# Patient Record
Sex: MALE | Race: WHITE | NOT HISPANIC OR LATINO | Employment: STUDENT | URBAN - METROPOLITAN AREA
[De-identification: names, ages, dates, MRNs, and addresses within clinical notes are randomized per-mention and may not be internally consistent; named-entity substitution may affect disease eponyms.]

---

## 2023-12-19 ENCOUNTER — OFFICE VISIT (OUTPATIENT)
Dept: PODIATRY | Facility: CLINIC | Age: 23
End: 2023-12-19
Payer: COMMERCIAL

## 2023-12-19 ENCOUNTER — LAB (OUTPATIENT)
Dept: LAB | Facility: LAB | Age: 23
End: 2023-12-19
Payer: COMMERCIAL

## 2023-12-19 DIAGNOSIS — B35.1 ONYCHOMYCOSIS: Primary | ICD-10-CM

## 2023-12-19 DIAGNOSIS — B35.1 ONYCHOMYCOSIS: ICD-10-CM

## 2023-12-19 LAB
ALBUMIN SERPL BCP-MCNC: 4.8 G/DL (ref 3.4–5)
ALP SERPL-CCNC: 82 U/L (ref 33–120)
ALT SERPL W P-5'-P-CCNC: 29 U/L (ref 10–52)
ANION GAP SERPL CALC-SCNC: 10 MMOL/L (ref 10–20)
AST SERPL W P-5'-P-CCNC: 23 U/L (ref 9–39)
BILIRUB SERPL-MCNC: 0.8 MG/DL (ref 0–1.2)
BUN SERPL-MCNC: 17 MG/DL (ref 6–23)
CALCIUM SERPL-MCNC: 9.8 MG/DL (ref 8.6–10.3)
CHLORIDE SERPL-SCNC: 101 MMOL/L (ref 98–107)
CO2 SERPL-SCNC: 29 MMOL/L (ref 21–32)
CREAT SERPL-MCNC: 1.01 MG/DL (ref 0.5–1.3)
GFR SERPL CREATININE-BSD FRML MDRD: >90 ML/MIN/1.73M*2
GLUCOSE SERPL-MCNC: 60 MG/DL (ref 74–99)
POTASSIUM SERPL-SCNC: 4.1 MMOL/L (ref 3.5–5.3)
PROT SERPL-MCNC: 7.6 G/DL (ref 6.4–8.2)
SODIUM SERPL-SCNC: 136 MMOL/L (ref 136–145)

## 2023-12-19 PROCEDURE — 80053 COMPREHEN METABOLIC PANEL: CPT

## 2023-12-19 PROCEDURE — 99213 OFFICE O/P EST LOW 20 MIN: CPT | Performed by: PODIATRIST

## 2023-12-19 PROCEDURE — 36415 COLL VENOUS BLD VENIPUNCTURE: CPT

## 2023-12-19 NOTE — PROGRESS NOTES
History Of Present Illness  Doc Ruiz is a 22 y.o. male presenting with chief complaint of:  Fungus of right great toenail has been a recurrent for the patient.  Nail is loose.     Past Medical History  He has no past medical history on file.    Surgical History  He has a past surgical history that includes Other surgical history (08/15/2022).     Social History  He has no history on file for tobacco use, alcohol use, and drug use.    Family History  No family history on file.     Allergies  Amoxicillin and Penicillins    Medications  No current outpatient medications on file.     No current facility-administered medications for this visit.       Review of Systems    REVIEW OF SYSTEMS  GENERAL:  Negative for malaise, significant weight loss, fever  CARDIOVASCULAR: leg swelling   MUSCULOSKELETAL:  Negative for joint pain or swelling, back pain, and muscle pain.  SKIN:  Negative for lesions, rash, and itching  PSYCH:  Negative for sleep disturbance, mood disorder and recent psychosocial stressors  NEURO: Negative, denies any burning, tingling or numbness     Objective:   Vasc: DP and PT pulses are palpable bilateral.  CFT is less than 3 seconds bilateral.  Skin temperature is warm to cool proximal to distal bilateral.      Neuro:  Light touch is intact to the foot bilateral.      Derm: Nails are normal. Skin is supple with normal texture and turgor noted.  Webspaces are clean, dry and intact bilateral.  There are no hyperkeratoses, ulcerations, verruca or other lesions noted.  Right first toenail is loosened on the proximal nail fold.  There is dried blood and fungal changes noted.    Ortho: Muscle strength is 5/5 for all pedal groups tested.  Ankle joint, subtalar joint, 1st MPJ and lesser MPJ ROM is full and without pain or crepitus.  The foot type is rectus bilateral off weight bearing.  There are no structural deformities noted.    Assessment/Plan     Problem List Items Addressed This Visit        Onychomycosis - Primary    Relevant Orders    Comprehensive Metabolic Panel       Will get LFTs today to see if patient is eligible for terbinafine.  Patient can follow-up with me as needed.  Mycotic nails debrided in length and thickness

## 2023-12-20 DIAGNOSIS — B35.1 ONYCHOMYCOSIS: Primary | ICD-10-CM

## 2023-12-20 RX ORDER — TERBINAFINE HYDROCHLORIDE 250 MG/1
250 TABLET ORAL DAILY
Qty: 30 TABLET | Refills: 2 | Status: SHIPPED | OUTPATIENT
Start: 2023-12-20 | End: 2024-03-13

## 2025-06-16 ENCOUNTER — TELEPHONE (OUTPATIENT)
Dept: PRIMARY CARE | Facility: CLINIC | Age: 25
End: 2025-06-16

## 2025-06-16 ENCOUNTER — APPOINTMENT (OUTPATIENT)
Dept: PEDIATRICS | Facility: CLINIC | Age: 25
End: 2025-06-16
Payer: COMMERCIAL

## 2025-06-16 NOTE — TELEPHONE ENCOUNTER
Pt mom would like a call regarding us recommending her son start care with a primary care.  [phone]

## 2025-06-23 ENCOUNTER — APPOINTMENT (OUTPATIENT)
Dept: PEDIATRICS | Facility: CLINIC | Age: 25
End: 2025-06-23
Payer: COMMERCIAL

## 2025-06-23 VITALS
HEIGHT: 69 IN | RESPIRATION RATE: 16 BRPM | HEART RATE: 64 BPM | SYSTOLIC BLOOD PRESSURE: 106 MMHG | DIASTOLIC BLOOD PRESSURE: 72 MMHG | BODY MASS INDEX: 25.43 KG/M2 | WEIGHT: 171.7 LBS

## 2025-06-23 DIAGNOSIS — R53.83 FATIGUE, UNSPECIFIED TYPE: ICD-10-CM

## 2025-06-23 DIAGNOSIS — R25.1 TREMOR: ICD-10-CM

## 2025-06-23 DIAGNOSIS — Z13.220 SCREENING CHOLESTEROL LEVEL: ICD-10-CM

## 2025-06-23 DIAGNOSIS — R00.2 PALPITATIONS: Primary | ICD-10-CM

## 2025-06-23 PROCEDURE — 99214 OFFICE O/P EST MOD 30 MIN: CPT | Performed by: STUDENT IN AN ORGANIZED HEALTH CARE EDUCATION/TRAINING PROGRAM

## 2025-06-23 PROCEDURE — 3008F BODY MASS INDEX DOCD: CPT | Performed by: STUDENT IN AN ORGANIZED HEALTH CARE EDUCATION/TRAINING PROGRAM

## 2025-06-23 ASSESSMENT — ENCOUNTER SYMPTOMS
TREMORS: 1
PALPITATIONS: 1

## 2025-06-23 NOTE — PROGRESS NOTES
"Subjective   Patient ID: Doc Ruiz is a 24 y.o. male who presents for Palpitations.  Today he is alone.     Doc is here today because he has been feeling off. He has had some times where he feels like his heart his beating faster than normal at times for no reason when he is just sitting still. It usually lasts a few minutes. It typically just slowly fades out to normal. It does not feel like it is skipping beats. He does not have associated lightheadedness or dizziness. He does sometimes feel like he breaths heavier when that happens. Doesn't seem to be related to feeling anxious or nervous.    He has also noticed a tremor in his hands. He feels like it has been there for years but mom has noticed it more in the past 6 months.    He also has been feeling more tired than usual. He gets about 8 hours of sleep at night. Doesn't think he snores. He does feel rested after sleep.    He has gained weight in the past couple years. He does not exercise much and admits to a big appetite.     He has noticed some hair thinning as well.    Palpitations         Review of Systems   Cardiovascular:  Positive for palpitations.   Neurological:  Positive for tremors.       Objective   /72   Pulse 64   Resp 16   Ht 1.753 m (5' 9.02\")   Wt 77.9 kg (171 lb 11.2 oz)   BMI 25.34 kg/m²   Growth percentiles: Facility age limit for growth %hilda is 20 years. Facility age limit for growth %hilda is 20 years.     Physical Exam  Constitutional:       Appearance: He is normal weight.   Eyes:      Conjunctiva/sclera: Conjunctivae normal.   Cardiovascular:      Rate and Rhythm: Normal rate and regular rhythm.      Heart sounds: Normal heart sounds.   Pulmonary:      Effort: Pulmonary effort is normal.      Breath sounds: Normal breath sounds.   Musculoskeletal:      Cervical back: Normal range of motion.   Neurological:      Mental Status: He is alert.      Comments: Tremor when holding hands out         No results found " for this or any previous visit (from the past 24 hours).    Assessment/Plan   Problem List Items Addressed This Visit    None  Visit Diagnoses         Palpitations    -  Primary    Relevant Orders    TSH with reflex to Free T4 if abnormal    Referral to Cardiology      Tremor        Relevant Orders    TSH with reflex to Free T4 if abnormal      Fatigue, unspecified type        Relevant Orders    CBC    Comprehensive Metabolic Panel    Vitamin D 25-Hydroxy,Total (for eval of Vitamin D levels)      Screening cholesterol level        Relevant Orders    Lipid Panel

## 2025-06-24 LAB
25(OH)D3+25(OH)D2 SERPL-MCNC: 43 NG/ML (ref 30–100)
ALBUMIN SERPL-MCNC: 4.9 G/DL (ref 3.6–5.1)
ALP SERPL-CCNC: 69 U/L (ref 36–130)
ALT SERPL-CCNC: 54 U/L (ref 9–46)
ANION GAP SERPL CALCULATED.4IONS-SCNC: 10 MMOL/L (CALC) (ref 7–17)
AST SERPL-CCNC: 26 U/L (ref 10–40)
BILIRUB SERPL-MCNC: 0.8 MG/DL (ref 0.2–1.2)
BUN SERPL-MCNC: 14 MG/DL (ref 7–25)
CALCIUM SERPL-MCNC: 9.7 MG/DL (ref 8.6–10.3)
CHLORIDE SERPL-SCNC: 103 MMOL/L (ref 98–110)
CHOLEST SERPL-MCNC: 194 MG/DL
CHOLEST/HDLC SERPL: 4 (CALC)
CO2 SERPL-SCNC: 23 MMOL/L (ref 20–32)
CREAT SERPL-MCNC: 0.86 MG/DL (ref 0.6–1.24)
EGFRCR SERPLBLD CKD-EPI 2021: 126 ML/MIN/1.73M2
ERYTHROCYTE [DISTWIDTH] IN BLOOD BY AUTOMATED COUNT: 11.7 % (ref 11–15)
GLUCOSE SERPL-MCNC: 80 MG/DL (ref 65–99)
HCT VFR BLD AUTO: 50.6 % (ref 38.5–50)
HDLC SERPL-MCNC: 49 MG/DL
HGB BLD-MCNC: 17 G/DL (ref 13.2–17.1)
LDLC SERPL CALC-MCNC: 126 MG/DL (CALC)
MCH RBC QN AUTO: 31 PG (ref 27–33)
MCHC RBC AUTO-ENTMCNC: 33.6 G/DL (ref 32–36)
MCV RBC AUTO: 92.3 FL (ref 80–100)
NONHDLC SERPL-MCNC: 145 MG/DL (CALC)
PLATELET # BLD AUTO: 296 THOUSAND/UL (ref 140–400)
PMV BLD REES-ECKER: 9.1 FL (ref 7.5–12.5)
POTASSIUM SERPL-SCNC: 4.4 MMOL/L (ref 3.5–5.3)
PROT SERPL-MCNC: 7.7 G/DL (ref 6.1–8.1)
RBC # BLD AUTO: 5.48 MILLION/UL (ref 4.2–5.8)
SODIUM SERPL-SCNC: 136 MMOL/L (ref 135–146)
TRIGL SERPL-MCNC: 88 MG/DL
TSH SERPL-ACNC: 1.32 MIU/L (ref 0.4–4.5)
WBC # BLD AUTO: 5.4 THOUSAND/UL (ref 3.8–10.8)

## 2025-06-25 ENCOUNTER — TELEPHONE (OUTPATIENT)
Dept: PEDIATRICS | Facility: CLINIC | Age: 25
End: 2025-06-25
Payer: COMMERCIAL

## 2025-06-25 NOTE — TELEPHONE ENCOUNTER
Result Communication    Resulted Orders   CBC   Result Value Ref Range    WHITE BLOOD CELL COUNT 5.4 3.8 - 10.8 Thousand/uL    RED BLOOD CELL COUNT 5.48 4.20 - 5.80 Million/uL    HEMOGLOBIN 17.0 13.2 - 17.1 g/dL    HEMATOCRIT 50.6 (H) 38.5 - 50.0 %    MCV 92.3 80.0 - 100.0 fL    MCH 31.0 27.0 - 33.0 pg    MCHC 33.6 32.0 - 36.0 g/dL      Comment:      For adults, a slight decrease in the calculated MCHC  value (in the range of 30 to 32 g/dL) is most likely  not clinically significant; however, it should be  interpreted with caution in correlation with other  red cell parameters and the patient's clinical  condition.      RDW 11.7 11.0 - 15.0 %    PLATELET COUNT 296 140 - 400 Thousand/uL    MPV 9.1 7.5 - 12.5 fL    Narrative    FASTING:YES    FASTING: YES   Comprehensive Metabolic Panel   Result Value Ref Range    GLUCOSE 80 65 - 99 mg/dL      Comment:                    Fasting reference interval         UREA NITROGEN (BUN) 14 7 - 25 mg/dL    CREATININE 0.86 0.60 - 1.24 mg/dL    EGFR 126 > OR = 60 mL/min/1.73m2    SODIUM 136 135 - 146 mmol/L    POTASSIUM 4.4 3.5 - 5.3 mmol/L    CHLORIDE 103 98 - 110 mmol/L    CARBON DIOXIDE 23 20 - 32 mmol/L    ELECTROLYTE BALANCE 10 7 - 17 mmol/L (calc)    CALCIUM 9.7 8.6 - 10.3 mg/dL    PROTEIN, TOTAL 7.7 6.1 - 8.1 g/dL    ALBUMIN 4.9 3.6 - 5.1 g/dL    BILIRUBIN, TOTAL 0.8 0.2 - 1.2 mg/dL    ALKALINE PHOSPHATASE 69 36 - 130 U/L    AST 26 10 - 40 U/L    ALT 54 (H) 9 - 46 U/L    Narrative    FASTING:YES    FASTING: YES   Lipid Panel   Result Value Ref Range    CHOLESTEROL, TOTAL 194 <200 mg/dL    HDL CHOLESTEROL 49 > OR = 40 mg/dL    TRIGLYCERIDES 88 <150 mg/dL    LDL-CHOLESTEROL 126 (H) mg/dL (calc)      Comment:      Reference range: <100     Desirable range <100 mg/dL for primary prevention;    <70 mg/dL for patients with CHD or diabetic patients   with > or = 2 CHD risk factors.     LDL-C is now calculated using the Angelo   calculation, which is a validated novel method  providing   better accuracy than the Friedewald equation in the   estimation of LDL-C.   Prateek SS et al. IRWIN. 2013;310(19): 8466-1862   (http://education.[facility]/faq/AXS098)      CHOL/HDLC RATIO 4.0 <5.0 (calc)    NON HDL CHOLESTEROL 145 (H) <130 mg/dL (calc)      Comment:      For patients with diabetes plus 1 major ASCVD risk   factor, treating to a non-HDL-C goal of <100 mg/dL   (LDL-C of <70 mg/dL) is considered a therapeutic   option.      Narrative    FASTING:YES    FASTING: YES   TSH with reflex to Free T4 if abnormal   Result Value Ref Range    TSH W/REFLEX TO FT4 1.32 0.40 - 4.50 mIU/L    Narrative    FASTING:YES    FASTING: YES   Vitamin D 25-Hydroxy,Total (for eval of Vitamin D levels)   Result Value Ref Range    VITAMIN D,25-OH,TOTAL,IA 43 30 - 100 ng/mL      Comment:      Vitamin D Status         25-OH Vitamin D:     Deficiency:                    <20 ng/mL  Insufficiency:             20 - 29 ng/mL  Optimal:                 > or = 30 ng/mL     For 25-OH Vitamin D testing on patients on   D2-supplementation and patients for whom quantitation   of D2 and D3 fractions is required, the QuestAssureD(TM)  25-OH VIT D, (D2,D3), LC/MS/MS is recommended: order   code 57423 (patients >2yrs).     See Note 1     Note 1     For additional information, please refer to   http://education.[facility]/faq/OJO943   (This link is being provided for informational/  educational purposes only.)      Narrative    FASTING:YES    FASTING: YES       8:26 AM      Results were successfully communicated with the patient and they acknowledged their understanding.

## 2025-06-25 NOTE — TELEPHONE ENCOUNTER
----- Message from Rosie Burr sent at 6/25/2025  8:12 AM EDT -----  Lab work showed mildly elevated cholesterol. Focus on healthy low fat diet with more fruits and vegetables  ----- Message -----  From: Sandy Lafleur Results In  Sent: 6/24/2025   8:33 PM EDT  To: Rosie Burr MD

## 2025-06-26 DIAGNOSIS — R53.83 FATIGUE, UNSPECIFIED TYPE: Primary | ICD-10-CM

## 2025-06-30 LAB — FERRITIN SERPL-MCNC: 133 NG/ML (ref 38–380)

## 2025-07-01 ENCOUNTER — APPOINTMENT (OUTPATIENT)
Dept: CARDIOLOGY | Facility: CLINIC | Age: 25
End: 2025-07-01
Payer: COMMERCIAL

## 2025-07-01 VITALS
WEIGHT: 173 LBS | HEIGHT: 69 IN | BODY MASS INDEX: 25.62 KG/M2 | HEART RATE: 89 BPM | SYSTOLIC BLOOD PRESSURE: 110 MMHG | DIASTOLIC BLOOD PRESSURE: 62 MMHG

## 2025-07-01 DIAGNOSIS — R00.2 PALPITATIONS: ICD-10-CM

## 2025-07-01 DIAGNOSIS — R94.31 ABNORMAL ELECTROCARDIOGRAM (ECG) (EKG): Primary | ICD-10-CM

## 2025-07-01 PROCEDURE — 99205 OFFICE O/P NEW HI 60 MIN: CPT | Performed by: NURSE PRACTITIONER

## 2025-07-01 PROCEDURE — 93000 ELECTROCARDIOGRAM COMPLETE: CPT | Performed by: INTERNAL MEDICINE

## 2025-07-01 PROCEDURE — 3008F BODY MASS INDEX DOCD: CPT | Performed by: NURSE PRACTITIONER

## 2025-07-02 NOTE — PROGRESS NOTES
Chief Complaint:  Chief Complaint   Patient presents with    New Patient Visit     Pt states he is having a fast heart rate       Doc Ruiz is a 24 y.o. male that presents to the office today with his mother for new patient cardiac evaluation.  He was referred by his primary care provider Shannan Burr MD for palpitations.  He denies history of CAD or valve disorders.  Denies previous cardiac evaluation.  He has a history of palpitations, tremors and fatigue.  He denies smoking, vaping, alcohol use, recreational drug use.  Denies caffeine use.  Consumes water.  Reports he is active he walks and golfs.  Denies family history of CAD.    He reports he has had ongoing palpitations for approximately 6 months but feels recently they have become more frequent and longer in duration.  He also states that he feels himself his heart rate elevates often and he notices this when he is at rest.  He denies any chest pain, chest pressure, chest tightness, lightheadedness or dizziness.  He does admit to some occasional shortness of breath.  Denies lower extremity edema.  His mother notes bilateral hand tremors which he feels has been ongoing for a very long time but she feels is new.     Testing Reviewed  EKG obtained in the office today and verified with Dr. Trevizo  shows NSR, right atrial enlargement HR 89  bpm, QT/Qtc 332/403    CBC:   Lab Results   Component Value Date    WBC 5.4 06/24/2025    RBC 5.48 06/24/2025    HGB 17.0 06/24/2025    HCT 50.6 (H) 06/24/2025     06/24/2025        CMP:    Lab Results   Component Value Date     06/24/2025    K 4.4 06/24/2025     06/24/2025    CO2 23 06/24/2025    BUN 14 06/24/2025    CREATININE 0.86 06/24/2025    GLUCOSE 80 06/24/2025    CALCIUM 9.7 06/24/2025       Lipid Profile:    Lab Results   Component Value Date    TRIG 88 06/24/2025    HDL 49 06/24/2025    LDLCALC 126 (H) 06/24/2025       BMP:  Lab Results   Component Value Date     06/24/2025     " 12/19/2023    K 4.4 06/24/2025    K 4.1 12/19/2023     06/24/2025     12/19/2023    CO2 23 06/24/2025    CO2 29 12/19/2023    BUN 14 06/24/2025    BUN 17 12/19/2023    CREATININE 0.86 06/24/2025    CREATININE 1.01 12/19/2023       CBC:  Lab Results   Component Value Date    WBC 5.4 06/24/2025    RBC 5.48 06/24/2025    HGB 17.0 06/24/2025    HCT 50.6 (H) 06/24/2025    MCV 92.3 06/24/2025    MCH 31.0 06/24/2025    MCHC 33.6 06/24/2025    RDW 11.7 06/24/2025     06/24/2025    MPV 9.1 06/24/2025       Hepatic Function Panel:    Lab Results   Component Value Date    ALKPHOS 69 06/24/2025    ALT 54 (H) 06/24/2025    AST 26 06/24/2025    PROT 7.7 06/24/2025    BILITOT 0.8 06/24/2025     TSH:    Lab Results   Component Value Date    TSH 1.32 06/24/2025         Problem List[1]    Social History[2]    Medical History[3]    Current Medications[4]    Amoxicillin and Penicillins    Family History[5]    Surgical History[6]       Review of systems  Constitutional: No weight loss, fever, chills, weakness or fatigue  HEENT: No visual loss, blurred vision, double vision or yellow sclerae  Skin: No rash or itching  Cardiovascular: No chest pain, pressure or discomfort.  Positive for palpitations  Respiratory: No shortness of breath, cough or sputum  Gastrointestinal: No nausea, vomiting or diarrhea. No bloody or dark tarry stools.  Neurological: No headache, lightheadedness, dizziness, syncope.   Musculoskeletal: Positive for hand tremors.  No muscle, back pain, joint pain or stiffness.  Hematologic: No anemia, bleeding or bruising.    /62 (BP Location: Left arm, Patient Position: Sitting)   Pulse 89   Ht 1.753 m (5' 9\")   Wt 78.5 kg (173 lb)   BMI 25.55 kg/m²     Physical Exam  Constitutional: Well developed, awake/alert x 3, no distress.  Head/Neck: No JVD, No bruits  Respiratory/Thorax: patent airways, CTAB, normal breath sounds with good expansion.  Cardiovascular: Regular rate and rhythm, no " murmurs, normal S1 and S2,   Gastrointestinal: Non distended, soft, non-tender, no rebound tenderness or guarding.  Extremities: No cyanosis, edema.   Neurological: Alert and oriented x 3. Moves extremities spontaneous with purpose.  Psychological: Appropriate mood and behavior  Skin: Warm and Dry. No lesions or rashes.     Assessment/Plan  Palpitations; reports feels these with an increased heart rate.  Obtain 7-day [facility] monitor to assess for atrial/ventricular arrhythmias.  Shortness of breath; reports shortness of breath and it can occur with rest or with exertion.  Obtain echocardiogram to assess for valve and pulm function.  Tremor; defer to PCP.  Follow-up in the office after testing for results and further recommendations  Please excuse any errors in grammar or translation related to dictation, voice recognition software was used to prepare this document.           [1]   Patient Active Problem List  Diagnosis    Onychomycosis    Palpitations   [2]   Social History  Tobacco Use    Smoking status: Never    Smokeless tobacco: Never   Substance Use Topics    Alcohol use: Yes     Comment: socially    Drug use: Never   [3] History reviewed. No pertinent past medical history.  [4] No current outpatient medications on file.  [5]   Family History  Problem Relation Name Age of Onset    Other (heart stent) Mother's Brother      Heart attack Maternal Great-Grandparent     [6]   Past Surgical History:  Procedure Laterality Date    OTHER SURGICAL HISTORY  08/15/2022    Rock tooth extraction

## 2025-07-21 ENCOUNTER — APPOINTMENT (OUTPATIENT)
Dept: PRIMARY CARE | Facility: CLINIC | Age: 25
End: 2025-07-21
Payer: COMMERCIAL

## 2025-07-21 VITALS
BODY MASS INDEX: 25.48 KG/M2 | TEMPERATURE: 97.2 F | WEIGHT: 172 LBS | DIASTOLIC BLOOD PRESSURE: 80 MMHG | HEIGHT: 69 IN | OXYGEN SATURATION: 98 % | SYSTOLIC BLOOD PRESSURE: 130 MMHG | HEART RATE: 85 BPM | RESPIRATION RATE: 14 BRPM

## 2025-07-21 DIAGNOSIS — R00.2 PALPITATIONS: ICD-10-CM

## 2025-07-21 DIAGNOSIS — Z00.00 PERIODIC HEALTH ASSESSMENT, GENERAL SCREENING, ADULT: Primary | ICD-10-CM

## 2025-07-21 DIAGNOSIS — R74.8 ELEVATED LIVER ENZYMES: ICD-10-CM

## 2025-07-21 PROCEDURE — 90471 IMMUNIZATION ADMIN: CPT | Performed by: INTERNAL MEDICINE

## 2025-07-21 PROCEDURE — 1036F TOBACCO NON-USER: CPT | Performed by: INTERNAL MEDICINE

## 2025-07-21 PROCEDURE — 99385 PREV VISIT NEW AGE 18-39: CPT | Performed by: INTERNAL MEDICINE

## 2025-07-21 PROCEDURE — 90715 TDAP VACCINE 7 YRS/> IM: CPT | Performed by: INTERNAL MEDICINE

## 2025-07-21 PROCEDURE — 3008F BODY MASS INDEX DOCD: CPT | Performed by: INTERNAL MEDICINE

## 2025-07-21 ASSESSMENT — PATIENT HEALTH QUESTIONNAIRE - PHQ9
2. FEELING DOWN, DEPRESSED OR HOPELESS: NOT AT ALL
1. LITTLE INTEREST OR PLEASURE IN DOING THINGS: NOT AT ALL
SUM OF ALL RESPONSES TO PHQ9 QUESTIONS 1 AND 2: 0

## 2025-07-21 ASSESSMENT — ENCOUNTER SYMPTOMS
SHORTNESS OF BREATH: 0
NAUSEA: 0
PALPITATIONS: 0
COUGH: 0
DIARRHEA: 0
CONSTIPATION: 0
WHEEZING: 0
ABDOMINAL PAIN: 0

## 2025-07-21 NOTE — PROGRESS NOTES
"Subjective   Patient ID: Doc Ruiz is a 24 y.o. male who presents for Annual Exam.    Overall doing well.  Patient is fairly active.  Denies any issues with CP,SOB or dizzy spells.  He does notice issues with palpitations, but primarily at rest.  Some fatigue.  No exertional symptoms.  He has seen cardiology previously.   No issues with anxiety, depression or sleep related problems. Denies any issues with HA, numbness or tingling.  No issues or changes with bowel or bladder habits.      Review of Systems   Respiratory:  Negative for cough, shortness of breath and wheezing.    Cardiovascular:  Negative for chest pain and palpitations.   Gastrointestinal:  Negative for abdominal pain, constipation, diarrhea and nausea.   ROS is otherwise unremarkable.       Objective   /80 (BP Location: Left arm, Patient Position: Sitting, BP Cuff Size: Adult)   Pulse 85   Temp 36.2 °C (97.2 °F) (Tympanic)   Resp 14   Ht 1.753 m (5' 9\")   Wt 78 kg (172 lb)   SpO2 98%   BMI 25.40 kg/m²     Physical Exam  Vitals reviewed.   Constitutional:       Appearance: Normal appearance.   HENT:      Head: Normocephalic.     Eyes:      Pupils: Pupils are equal, round, and reactive to light.       Cardiovascular:      Rate and Rhythm: Normal rate and regular rhythm.   Pulmonary:      Effort: Pulmonary effort is normal.      Breath sounds: Normal breath sounds.     Musculoskeletal:         General: Normal range of motion.     Neurological:      General: No focal deficit present.      Mental Status: He is alert.     Psychiatric:         Mood and Affect: Mood normal.         Assessment/Plan   Problem List Items Addressed This Visit           ICD-10-CM    Palpitations R00.2     Other Visit Diagnoses         Codes      Periodic health assessment, general screening, adult    -  Primary Z00.00      Elevated liver enzymes     R74.8    Relevant Orders    Comprehensive Metabolic Panel        Physical exam is unremarkable.  We " reviewed and discussed all the above.  We discussed current medications as well as most recent test results.  We discussed the importance and benefits of a healthy diet that is both low in sugars and low in saturated fats.  We reviewed and discussed the benefits of regular physical exercise especially when at or above a level of 150 minutes/week.  We also discussed the importance of stress management and good sleep hygiene.s.  he denies any high risk behavior.    We will recheck his liver test  We will continue to work on lifestyle improvements and follow-up in 6 to 12 months, sooner if any issues should arise.

## 2025-08-21 ENCOUNTER — APPOINTMENT (OUTPATIENT)
Dept: CARDIOLOGY | Facility: CLINIC | Age: 25
End: 2025-08-21
Payer: COMMERCIAL

## 2025-08-21 ENCOUNTER — APPOINTMENT (OUTPATIENT)
Dept: CARDIOLOGY | Facility: HOSPITAL | Age: 25
End: 2025-08-21
Payer: COMMERCIAL

## 2025-08-22 ENCOUNTER — ANCILLARY PROCEDURE (OUTPATIENT)
Dept: CARDIOLOGY | Facility: HOSPITAL | Age: 25
End: 2025-08-22
Payer: COMMERCIAL

## 2025-08-22 ENCOUNTER — APPOINTMENT (OUTPATIENT)
Dept: CARDIOLOGY | Facility: CLINIC | Age: 25
End: 2025-08-22
Payer: COMMERCIAL

## 2025-08-22 DIAGNOSIS — R00.2 PALPITATIONS: ICD-10-CM

## 2025-08-22 DIAGNOSIS — R94.31 ABNORMAL ELECTROCARDIOGRAM (ECG) (EKG): ICD-10-CM

## 2025-08-22 LAB
AORTIC VALVE MEAN GRADIENT: 2 MMHG
AORTIC VALVE PEAK VELOCITY: 0.95 M/S
AV PEAK GRADIENT: 4 MMHG
AVA (PEAK VEL): 2.75 CM2
AVA (VTI): 3.04 CM2
EJECTION FRACTION APICAL 4 CHAMBER: 50.2
EJECTION FRACTION: 65 %
LEFT VENTRICLE INTERNAL DIMENSION DIASTOLE: 5.08 CM (ref 3.5–6)
LEFT VENTRICULAR OUTFLOW TRACT DIAMETER: 2.1 CM
LV EJECTION FRACTION BIPLANE: 51 %
MITRAL VALVE E/A RATIO: 1.46
MITRAL VALVE E/E' RATIO: 4.29
RIGHT VENTRICLE FREE WALL PEAK S': 14.48 CM/S
TRICUSPID ANNULAR PLANE SYSTOLIC EXCURSION: 2.2 CM

## 2025-08-22 PROCEDURE — 93306 TTE W/DOPPLER COMPLETE: CPT

## 2025-08-22 PROCEDURE — 93306 TTE W/DOPPLER COMPLETE: CPT | Performed by: INTERNAL MEDICINE

## 2025-09-04 ENCOUNTER — APPOINTMENT (OUTPATIENT)
Dept: CARDIOLOGY | Facility: CLINIC | Age: 25
End: 2025-09-04
Payer: COMMERCIAL

## 2025-09-04 PROBLEM — R53.82 CHRONIC FATIGUE: Status: ACTIVE | Noted: 2025-09-04

## 2025-09-04 PROBLEM — R06.02 SHORTNESS OF BREATH: Status: ACTIVE | Noted: 2025-09-04

## 2025-09-04 PROBLEM — R94.31 ABNORMAL ELECTROCARDIOGRAM (ECG) (EKG): Status: ACTIVE | Noted: 2025-09-04

## 2025-09-04 PROBLEM — Z86.69 H/O BENIGN ESSENTIAL TREMOR: Status: ACTIVE | Noted: 2025-09-04

## 2025-12-11 ENCOUNTER — APPOINTMENT (OUTPATIENT)
Dept: CARDIOLOGY | Facility: CLINIC | Age: 25
End: 2025-12-11
Payer: COMMERCIAL